# Patient Record
Sex: FEMALE | HISPANIC OR LATINO | URBAN - METROPOLITAN AREA
[De-identification: names, ages, dates, MRNs, and addresses within clinical notes are randomized per-mention and may not be internally consistent; named-entity substitution may affect disease eponyms.]

---

## 2017-06-01 ENCOUNTER — APPOINTMENT (RX ONLY)
Dept: URBAN - METROPOLITAN AREA CLINIC 122 | Facility: CLINIC | Age: 27
Setting detail: DERMATOLOGY
End: 2017-06-01

## 2017-06-01 DIAGNOSIS — Z41.9 ENCOUNTER FOR PROCEDURE FOR PURPOSES OTHER THAN REMEDYING HEALTH STATE, UNSPECIFIED: ICD-10-CM

## 2017-06-01 PROCEDURE — ? LASER TATTOO REMOVAL

## 2017-06-01 ASSESSMENT — LOCATION DETAILED DESCRIPTION DERM: LOCATION DETAILED: RIGHT DISTAL LATERAL CALF

## 2017-06-01 ASSESSMENT — LOCATION SIMPLE DESCRIPTION DERM: LOCATION SIMPLE: RIGHT CALF

## 2017-06-01 ASSESSMENT — LOCATION ZONE DERM: LOCATION ZONE: LEG

## 2017-06-01 NOTE — HPI: COSMETIC (LASER TATTOO REMOVAL)
Have You Had Laser Tattoo Removal Before?: has not had previous treatments
When Outside In The Sun, Do You...: mostly tans, rarely burns

## 2017-06-01 NOTE — PROCEDURE: LASER TATTOO REMOVAL
External Cooling Fan Speed: 0
Consent: Written consent obtained, risks reviewed including but not limited to crusting, scabbing, blistering, scarring, darker or lighter pigmentary change, systemic reactions, ulceration, incidental hair removal, bruising, and/or incomplete removal.
Detail Level: Detailed
Laser Type: Q-switched Alexandrite 755nm
Spot Size In Mm: 2
Tattoo Color Treated: Black
Endpoint Text: Immediate endpoint: skin whitening and pinpoint bleeding.
Anesthesia Volume In Cc: 4
Post-Procedure Text: Polysporin and bandage applied. Post care reviewed with patient.
Spot Size In Mm: 5
Pre-Procedure Text: The treatment areas were cleaned and treated with the laser parameters noted above.
Post-Care Instructions: I reviewed with the patient in detail post-care instructions. Patient should apply vaseline twice daily to tattoo and keep it covered with a non-stick adhesive dressing.
Fluence: 1.7
Anesthesia Type: 1% lidocaine without epinephrine
Area In Cm^2: 7

## 2017-06-14 ENCOUNTER — RX ONLY (OUTPATIENT)
Age: 27
Setting detail: RX ONLY
End: 2017-06-14

## 2017-06-14 RX ORDER — TAZAROTENE 1 MG/G
AEROSOL, FOAM TOPICAL
Qty: 50 | Refills: 3 | Status: ERX | COMMUNITY
Start: 2017-06-14

## 2017-06-21 ENCOUNTER — RX ONLY (OUTPATIENT)
Age: 27
Setting detail: RX ONLY
End: 2017-06-21

## 2017-06-21 RX ORDER — CALCIPOTRIENE 50 UG/G
AEROSOL, FOAM TOPICAL
Qty: 60 | Refills: 0 | Status: ERX | COMMUNITY
Start: 2017-06-21

## 2017-07-06 ENCOUNTER — RX ONLY (OUTPATIENT)
Age: 27
Setting detail: RX ONLY
End: 2017-07-06

## 2017-07-06 RX ORDER — AZITHROMYCIN DIHYDRATE 250 MG/1
TABLET, FILM COATED ORAL
Qty: 1 | Refills: 0 | Status: ERX | COMMUNITY
Start: 2017-07-06

## 2017-07-19 ENCOUNTER — RX ONLY (OUTPATIENT)
Age: 27
Setting detail: RX ONLY
End: 2017-07-19

## 2017-07-19 RX ORDER — CLOBETASOL PROPIONATE 0.5 MG/ML
SOLUTION TOPICAL
Qty: 1 | Refills: 4 | Status: ERX | COMMUNITY
Start: 2017-07-19

## 2018-01-25 ENCOUNTER — RX ONLY (OUTPATIENT)
Age: 28
Setting detail: RX ONLY
End: 2018-01-25

## 2018-01-25 RX ORDER — ERYTHROMYCIN AND BENZOYL PEROXIDE 30; 50 MG/G; MG/G
1 GEL TOPICAL
Qty: 1 | Refills: 5 | Status: ERX | COMMUNITY
Start: 2018-01-25

## 2018-03-22 ENCOUNTER — APPOINTMENT (RX ONLY)
Dept: URBAN - METROPOLITAN AREA CLINIC 122 | Facility: CLINIC | Age: 28
Setting detail: DERMATOLOGY
End: 2018-03-22

## 2018-03-22 DIAGNOSIS — B07.8 OTHER VIRAL WARTS: ICD-10-CM

## 2018-03-22 PROCEDURE — ? BENIGN DESTRUCTION

## 2018-03-22 PROCEDURE — 17110 DESTRUCTION B9 LES UP TO 14: CPT

## 2018-03-22 ASSESSMENT — LOCATION ZONE DERM: LOCATION ZONE: LEG

## 2018-03-22 ASSESSMENT — LOCATION SIMPLE DESCRIPTION DERM: LOCATION SIMPLE: LEFT KNEE

## 2018-03-22 ASSESSMENT — LOCATION DETAILED DESCRIPTION DERM: LOCATION DETAILED: LEFT KNEE

## 2018-03-22 NOTE — PROCEDURE: BENIGN DESTRUCTION
Medical Necessity Clause: This procedure was medically necessary,
Medical Necessity Information: It is in your best interest to select a reason for this procedure from the list below. All of these items fulfill various CMS LCD requirements except the new and changing color options.
Detail Level: Zone
Anesthesia Type: 1% lidocaine without epinephrine and a 1:10 solution of 8.4% sodium bicarbonate
Total Number Of Lesions Treated: 2
Include Z78.9 (Other Specified Conditions Influencing Health Status) As An Associated Diagnosis?: No
Consent: The patient's consent was obtained including but not limited to risks of crusting, scabbing, blistering, scarring, darker or lighter pigmentary change, recurrence, incomplete removal and infection.
Post-Care Instructions: I reviewed with the patient in detail post-care instructions. Patient is to wear sunprotection, and avoid picking at any of the treated lesions. Pt may apply Vaseline to crusted or scabbing areas.wash off in two hours
Anesthesia Volume In Cc: 0.2

## 2018-03-29 ENCOUNTER — RX ONLY (OUTPATIENT)
Age: 28
Setting detail: RX ONLY
End: 2018-03-29

## 2018-03-29 RX ORDER — TAZAROTENE 1 MG/G
1 CREAM TOPICAL AS DIRECTED
Qty: 1 | Refills: 3 | Status: ERX | COMMUNITY
Start: 2018-03-29

## 2018-03-29 RX ORDER — SODIUM SULFACETAMIDE AND SULFUR 80; 40 MG/ML; MG/ML
1 LOTION TOPICAL AS DIRECTED
Qty: 1 | Refills: 3 | Status: ERX | COMMUNITY
Start: 2018-03-29

## 2018-03-29 RX ORDER — SALICYLIC ACID 6 %-6 %
1 KIT TOPICAL AS DIRECTED
Qty: 1 | Refills: 3 | Status: ERX | COMMUNITY
Start: 2018-03-29

## 2018-03-29 RX ORDER — CLINDAMYCIN PHOSPHATE 10 MG/G
1 GEL TOPICAL AS DIRECTED
Qty: 1 | Refills: 3 | Status: ERX | COMMUNITY
Start: 2018-03-29

## 2018-05-03 ENCOUNTER — RX ONLY (OUTPATIENT)
Age: 28
Setting detail: RX ONLY
End: 2018-05-03

## 2018-05-03 RX ORDER — CLOBETASOL PROPIONATE 0.5 MG/G
OINTMENT TOPICAL
Qty: 60 | Refills: 3 | Status: ERX | COMMUNITY
Start: 2018-05-03

## 2018-05-22 ENCOUNTER — APPOINTMENT (RX ONLY)
Dept: URBAN - METROPOLITAN AREA CLINIC 122 | Facility: CLINIC | Age: 28
Setting detail: DERMATOLOGY
End: 2018-05-22

## 2019-02-13 ENCOUNTER — RX ONLY (OUTPATIENT)
Age: 29
Setting detail: RX ONLY
End: 2019-02-13

## 2019-02-13 RX ORDER — HALOBETASOL PROPIONATE 0.5 MG/G
AEROSOL, FOAM TOPICAL
Qty: 1 | Refills: 3 | Status: ERX | COMMUNITY
Start: 2019-02-13

## 2019-02-18 ENCOUNTER — APPOINTMENT (RX ONLY)
Dept: URBAN - METROPOLITAN AREA CLINIC 122 | Facility: CLINIC | Age: 29
Setting detail: DERMATOLOGY
End: 2019-02-18

## 2019-02-18 DIAGNOSIS — B07.8 OTHER VIRAL WARTS: ICD-10-CM

## 2019-02-18 DIAGNOSIS — L84 CORNS AND CALLOSITIES: ICD-10-CM

## 2019-02-18 PROBLEM — E03.9 HYPOTHYROIDISM, UNSPECIFIED: Status: ACTIVE | Noted: 2019-02-18

## 2019-02-18 PROCEDURE — 17110 DESTRUCTION B9 LES UP TO 14: CPT

## 2019-02-18 PROCEDURE — ? TREATMENT REGIMEN

## 2019-02-18 PROCEDURE — ? BENIGN DESTRUCTION

## 2019-02-18 PROCEDURE — 99212 OFFICE O/P EST SF 10 MIN: CPT | Mod: 25

## 2019-02-18 ASSESSMENT — LOCATION DETAILED DESCRIPTION DERM: LOCATION DETAILED: LEFT LATERAL PLANTAR 4TH TOE

## 2019-02-18 ASSESSMENT — LOCATION SIMPLE DESCRIPTION DERM: LOCATION SIMPLE: PLANTAR SURFACE OF LEFT 4TH TOE

## 2019-02-18 ASSESSMENT — LOCATION ZONE DERM: LOCATION ZONE: TOE

## 2019-02-18 NOTE — PROCEDURE: BENIGN DESTRUCTION
Add 52 Modifier (Optional): no
Total Number Of Lesions Treated: 1
Detail Level: Simple
Consent: The patient's consent was obtained including but not limited to risks of crusting, scabbing, blistering, scarring, darker or lighter pigmentary change, recurrence, incomplete removal and infection.
Medical Necessity Information: It is in your best interest to select a reason for this procedure from the list below. All of these items fulfill various CMS LCD requirements except the new and changing color options.
Post-Care Instructions: I reviewed with the patient in detail post-care instructions. Patient is to wear sunprotection, and avoid picking at any of the treated lesions. Pt may apply Vaseline to crusted or scabbing areas.wash off in two hours
Medical Necessity Clause: This procedure was medically necessary,

## 2019-06-03 ENCOUNTER — RX ONLY (OUTPATIENT)
Age: 29
Setting detail: RX ONLY
End: 2019-06-03

## 2019-06-03 RX ORDER — KETOCONAZOLE 20.5 MG/ML
SMALL SHAMPOO, SUSPENSION TOPICAL DAILY
Qty: 120 | Refills: 10 | Status: ERX | COMMUNITY
Start: 2019-06-03

## 2019-08-22 ENCOUNTER — RX ONLY (OUTPATIENT)
Age: 29
Setting detail: RX ONLY
End: 2019-08-22

## 2019-08-22 RX ORDER — SILVER SULFADIAZINE 10 MG/G
1 CREAM TOPICAL
Qty: 1 | Refills: 1 | Status: ERX | COMMUNITY
Start: 2019-08-22

## 2019-10-31 ENCOUNTER — RX ONLY (OUTPATIENT)
Age: 29
Setting detail: RX ONLY
End: 2019-10-31

## 2019-10-31 RX ORDER — AMMONIUM LACTATE 120 MG/G
CREAM TOPICAL
Qty: 1 | Refills: 5 | Status: ERX | COMMUNITY
Start: 2019-10-31

## 2019-11-12 ENCOUNTER — APPOINTMENT (RX ONLY)
Dept: URBAN - METROPOLITAN AREA CLINIC 83 | Facility: CLINIC | Age: 29
Setting detail: DERMATOLOGY
End: 2019-11-12

## 2019-11-12 DIAGNOSIS — L259 CONTACT DERMATITIS AND OTHER ECZEMA, UNSPECIFIED CAUSE: ICD-10-CM

## 2019-11-12 PROCEDURE — ? EXTENDED SERIES READING

## 2019-11-12 NOTE — PROCEDURE: EXTENDED SERIES READING
Name Of Allergen 60: TRICLOSAN
Allergen 106 Reaction: no reaction
Name Of Allergen 32: THIMEROSAL
Name Of Allergen 55: 2-Hydroxyethyl methacrylate
Name Of Allergen 5: IMIDAZOLIDINYL UREA
Name Of Allergen 64: 2-n-Octyl-4-isothiazolin-3-one
Name Of Allergen 19: MYROXYLON PEREIRAE RESIN
Name Of Allergen 82: *METAL* Vanadium (III) chloride
Name Of Allergen 42: Methyl methacrylate
Detail Level: Zone
Name Of Allergen 29: GLUTARAL
Name Of Allergen 52: BENZYL SALICYLATE
Name Of Allergen 2: 2-MERCAPTOBENZOTHIAZOLE(MBT)
Name Of Allergen 11: Clobetsol-17-propionate
Name Of Allergen 25: DISPERSE ORANGE 3
Name Of Allergen 48: Hydrocortisone-17-butyrate
Name Of Allergen 75: Amidoamine
Name Of Allergen 35: CHLOROXYLENOL (PCMX)
Name Of Allergen 67: Lidocaine
Number Of Patches Read: 1
Name Of Allergen 8: CARBA MIX
Name Of Allergen 58: BENZYL ALCOHOL
Name Of Allergen 72: HYDROXYISOHEXYL 3- CYCLOHEXENE CARBOXALDEHYDE
Name Of Allergen 22: TOCOPHEROL
Name Of Allergen 85: *METAL* Titanium (III) nitride
Name Of Allergen 45: Budesonide
Name Of Allergen 81: *METAL* Molybdenum
Name Of Allergen 28: Fragrance mix I
Name Of Allergen 1: BENZOCAINE
Name Of Allergen 51: Disperse Yellow 3
Name Of Allergen 78: METHYLISOTHIAZOLINONE + METHYLCHLOROISOTHIAZOLINONE
Name Of Allergen 38: Gold(I)sodium thiosulfate dihydrate
Name Of Allergen 14: HERBERT
Name Of Allergen 70: Benzoylperoxide
Name Of Allergen 7: AMERCHOL L-101
Name Of Allergen 57: CANANGA ODORATA OIL
Name Of Allergen 66: Compositae mix II
Name Of Allergen 21: DIAZOLIDINYL UREA
Name Of Allergen 84: *METAL* Titanium
Name Of Allergen 44: Tixocortol-21-pivalate
Name Of Allergen 31: Sesquiterpene lactone mix
Name Of Allergen 4: P-PHENYLENEDIAMINE(PPD)
Name Of Allergen 54: METHYLISOTHIAZOLINONE
Name Of Allergen 63: IODOPROPYNYL BUTYLCARBAMATE
Name Of Allergen 18: Potassium dichromate
Show Negative Results In The Note?: Yes
Name Of Allergen 17: N-Isopropyl-N-phenyl-4-phenykenediamine (IPPD)
Name Of Allergen 41: Toluenesulfonamide formalyde resin
Name Of Allergen 13: Epoxy resin, Bisphenol A
Name Of Allergen 37: 2-tert-Butyl-4-methoxyphenol
Name Of Allergen 69: Dibucaine hydrochloride
Name Of Allergen 10: THIURAM MIX
Name Of Allergen 74: BENZALKONIUM CHLORIDE
Name Of Allergen 24: Mixed dialkyl thiourea
Name Of Allergen 47: TRIETHANOLAMINE
What Reading Time Point?: 24 hour
Name Of Allergen 34: BENZOPHENONE-3
Name Of Allergen 53: DECYL GLUCOSIDE
Name Of Allergen 30: 2-BROMO-2-NITROPROPANE-1,3-DIOL
Name Of Allergen 3: COLOPHONIUM
Name Of Allergen 62: POLYSORBATE 80
Name Of Allergen 80: Dimethylol dihydroxy ethylene urea
Name Of Allergen 40: GLYCERYL THIOGLYCOLATE
Name Of Allergen 16: Mercapto mix
Name Of Allergen 27: METHYLDIBROMO GLUTARONITRILE
Name Of Allergen 77: FORMALDEHYDE
Name Of Allergen 50: Fragrance mix II
Name Of Allergen 9: NEOMYCIN SULFATE
Name Of Allergen 59: ISOPROPYL MYRISTATE
Name Of Allergen 23: Bacitracin
Name Of Allergen 73: ETHYHEXYL SALICYLATE
Show Allergen Counseling In The Note?: No
Name Of Allergen 46: COCAMIDE
Name Of Allergen 33: PROPOLIS
Name Of Allergen 65: Disperse Blue mix 106 / 124
Name Of Allergen 6: CINNAMAL
Name Of Allergen 20: Nickel(II)sulfate hexahydrate
Name Of Allergen 56: DMDM HYDANTOIN
Name Of Allergen 43: Colbalt(II)chloride hexahydrate
Name Of Allergen 83: *METAL* Vanadium
Name Of Allergen 15: 5-wpkz-Ykxboktndjffyewuwzxailn resin (PTBP)
Name Of Allergen 61: DESOXIMETASONE
Name Of Allergen 39: Ethyl acrylate
Name Of Allergen 79: PROPYLENE GLYCOL
Name Of Allergen 71: ISOAMYL p-METHOXYCINNAMATE
Name Of Allergen 49: Tea Tree Oil oxidized
Name Of Allergen 26: Paraben mix
Name Of Allergen 12: Ethylendiamine dihydrochloride
Name Of Allergen 76: COAMIDOPROPYL BETAINE
Name Of Allergen 36: Ethyleneurea, melamie formaldehyde mix
Name Of Allergen 68: Fusidic acid sodium salt

## 2019-11-12 NOTE — PROCEDURE: EXTENDED SERIES READING
Allergen 122 Reaction: no reaction
Name Of Allergen 81: *METAL* Molybdenum
Name Of Allergen 4: P-PHENYLENEDIAMINE(PPD)
Name Of Allergen 40: GLYCERYL THIOGLYCOLATE
Name Of Allergen 23: Bacitracin
Name Of Allergen 19: MYROXYLON PEREIRAE RESIN
Name Of Allergen 56: DMDM HYDANTOIN
Name Of Allergen 47: TRIETHANOLAMINE
Name Of Allergen 46: COCAMIDE
Name Of Allergen 74: BENZALKONIUM CHLORIDE
Name Of Allergen 51: Disperse Yellow 3
Name Of Allergen 66: Compositae mix II
Name Of Allergen 64: 2-n-Octyl-4-isothiazolin-3-one
Name Of Allergen 49: Tea Tree Oil oxidized
Name Of Allergen 16: Mercapto mix
Name Of Allergen 76: COAMIDOPROPYL BETAINE
Number Of Patches Read: 1
Name Of Allergen 26: Paraben mix
Name Of Allergen 37: 2-tert-Butyl-4-methoxyphenol
Name Of Allergen 79: PROPYLENE GLYCOL
Name Of Allergen 67: Lidocaine
Name Of Allergen 25: DISPERSE ORANGE 3
Name Of Allergen 72: HYDROXYISOHEXYL 3- CYCLOHEXENE CARBOXALDEHYDE
Name Of Allergen 75: Amidoamine
Name Of Allergen 69: Dibucaine hydrochloride
Name Of Allergen 18: Potassium dichromate
Name Of Allergen 21: DIAZOLIDINYL UREA
Name Of Allergen 13: Epoxy resin, Bisphenol A
Name Of Allergen 30: 2-BROMO-2-NITROPROPANE-1,3-DIOL
Name Of Allergen 39: Ethyl acrylate
Name Of Allergen 71: ISOAMYL p-METHOXYCINNAMATE
Name Of Allergen 54: METHYLISOTHIAZOLINONE
Name Of Allergen 11: Clobetsol-17-propionate
Name Of Allergen 63: IODOPROPYNYL BUTYLCARBAMATE
Name Of Allergen 14: HERBERT
Name Of Allergen 80: Dimethylol dihydroxy ethylene urea
Name Of Allergen 73: ETHYHEXYL SALICYLATE
Name Of Allergen 17: N-Isopropyl-N-phenyl-4-phenykenediamine (IPPD)
Name Of Allergen 50: Fragrance mix II
Name Of Allergen 35: CHLOROXYLENOL (PCMX)
Name Of Allergen 27: METHYLDIBROMO GLUTARONITRILE
Name Of Allergen 78: METHYLISOTHIAZOLINONE + METHYLCHLOROISOTHIAZOLINONE
Name Of Allergen 60: TRICLOSAN
Name Of Allergen 48: Hydrocortisone-17-butyrate
Name Of Allergen 34: BENZOPHENONE-3
Name Of Allergen 59: ISOPROPYL MYRISTATE
Name Of Allergen 45: Budesonide
Name Of Allergen 1: BENZOCAINE
Name Of Allergen 70: Benzoylperoxide
What Reading Time Point?: 24 hour
Name Of Allergen 5: IMIDAZOLIDINYL UREA
Name Of Allergen 42: Methyl methacrylate
Name Of Allergen 28: Fragrance mix I
Name Of Allergen 6: CINNAMAL
Name Of Allergen 85: *METAL* Titanium (III) nitride
Name Of Allergen 29: GLUTARAL
Name Of Allergen 31: Sesquiterpene lactone mix
Name Of Allergen 8: CARBA MIX
Name Of Allergen 53: DECYL GLUCOSIDE
Name Of Allergen 68: Fusidic acid sodium salt
Name Of Allergen 61: DESOXIMETASONE
Detail Level: Zone
Name Of Allergen 52: BENZYL SALICYLATE
Name Of Allergen 32: THIMEROSAL
Name Of Allergen 44: Tixocortol-21-pivalate
Name Of Allergen 2: 2-MERCAPTOBENZOTHIAZOLE(MBT)
Name Of Allergen 84: *METAL* Titanium
Name Of Allergen 77: FORMALDEHYDE
Name Of Allergen 43: Colbalt(II)chloride hexahydrate
Name Of Allergen 24: Mixed dialkyl thiourea
Name Of Allergen 62: POLYSORBATE 80
Name Of Allergen 12: Ethylenediamine dihydrochloride
Name Of Allergen 82: *METAL* Vanadium (III) chloride
Name Of Allergen 22: TOCOPHEROL
Name Of Allergen 3: COLOPHONIUM
Name Of Allergen 9: NEOMYCIN SULFATE
Name Of Allergen 65: Disperse Blue mix 106 / 124
Show Allergen Counseling In The Note?: No
Name Of Allergen 38: Gold(I)sodium thiosulfate dihydrate
Show Negative Results In The Note?: Yes
Name Of Allergen 7: AMERCHOL L-101
Name Of Allergen 57: CANANGA ODORATA OIL
Name Of Allergen 41: Toluenesulfonamide formalyde resin
Name Of Allergen 15: 9-fpso-Qdpowpeosqgagqhnlbqdjib resin (PTBP)
Name Of Allergen 10: THIURAM MIX
Name Of Allergen 20: Nickel(II)sulfate hexahydrate
Name Of Allergen 58: BENZYL ALCOHOL
Name Of Allergen 55: 2-Hydroxyethyl methacrylate
Name Of Allergen 36: Ethyleneurea, melamie formaldehyde mix
Name Of Allergen 33: PROPOLIS
Name Of Allergen 83: *METAL* Vanadium
Name Of Allergen 12: Ethylendiamine dihydrochloride

## 2019-11-21 ENCOUNTER — APPOINTMENT (RX ONLY)
Dept: URBAN - METROPOLITAN AREA CLINIC 83 | Facility: CLINIC | Age: 29
Setting detail: DERMATOLOGY
End: 2019-11-21

## 2019-11-21 DIAGNOSIS — L259 CONTACT DERMATITIS AND OTHER ECZEMA, UNSPECIFIED CAUSE: ICD-10-CM

## 2019-11-21 PROCEDURE — ? EXTENDED SERIES READING

## 2019-11-21 NOTE — PROCEDURE: EXTENDED SERIES READING
Allergen 159 Reaction: no reaction
Name Of Allergen 1: BENZOCAINE
Name Of Allergen 8: CARBA MIX
Name Of Allergen 4: P-PHENYLENEDIAMINE(PPD)
What Reading Time Point?: 24 hour
Name Of Allergen 11: Clobetsol-17-propionate
Show Negative Results In The Note?: Yes
Name Of Allergen 7: AMERCHOL L-101
Name Of Allergen 10: THIURAM MIX
Name Of Allergen 3: COLOPHONIUM
Detail Level: Zone
Name Of Allergen 6: CINNAMAL
Show Allergen Counseling In The Note?: No
Name Of Allergen 2: 2-MERCAPTOBENZOTHIAZOLE(MBT)
Name Of Allergen 9: NEOMYCIN SULFATE
Name Of Allergen 5: IMIDAZOLIDINYL UREA
Number Of Patches Read: 1
Name Of Allergen 12: Ethylendiamine dihydrochloride

## 2020-01-31 ENCOUNTER — RX ONLY (OUTPATIENT)
Age: 30
Setting detail: RX ONLY
End: 2020-01-31

## 2020-01-31 RX ORDER — ADAPALENE AND BENZOYL PEROXIDE 3; 25 MG/G; MG/G
GEL TOPICAL
Qty: 1 | Refills: 7 | Status: ERX | COMMUNITY
Start: 2020-01-31

## 2020-02-03 ENCOUNTER — APPOINTMENT (RX ONLY)
Dept: URBAN - METROPOLITAN AREA CLINIC 83 | Facility: CLINIC | Age: 30
Setting detail: DERMATOLOGY
End: 2020-02-03

## 2020-02-03 DIAGNOSIS — L70.0 ACNE VULGARIS: ICD-10-CM

## 2020-02-03 PROCEDURE — ? COUNSELING

## 2020-02-03 PROCEDURE — 99213 OFFICE O/P EST LOW 20 MIN: CPT

## 2020-02-03 PROCEDURE — ? TREATMENT REGIMEN

## 2020-02-03 ASSESSMENT — LOCATION DETAILED DESCRIPTION DERM
LOCATION DETAILED: LEFT INFERIOR MEDIAL MALAR CHEEK
LOCATION DETAILED: RIGHT INFERIOR CENTRAL MALAR CHEEK
LOCATION DETAILED: LEFT CENTRAL MALAR CHEEK

## 2020-02-03 ASSESSMENT — LOCATION SIMPLE DESCRIPTION DERM
LOCATION SIMPLE: LEFT CHEEK
LOCATION SIMPLE: RIGHT CHEEK

## 2020-02-03 ASSESSMENT — LOCATION ZONE DERM: LOCATION ZONE: FACE

## 2020-02-03 NOTE — PROCEDURE: COUNSELING
Erythromycin Pregnancy And Lactation Text: This medication is Pregnancy Category B and is considered safe during pregnancy. It is also excreted in breast milk.
Topical Retinoid Pregnancy And Lactation Text: This medication is Pregnancy Category C. It is unknown if this medication is excreted in breast milk.
Bactrim Counseling:  I discussed with the patient the risks of sulfa antibiotics including but not limited to GI upset, allergic reaction, drug rash, diarrhea, dizziness, photosensitivity, and yeast infections.  Rarely, more serious reactions can occur including but not limited to aplastic anemia, agranulocytosis, methemoglobinemia, blood dyscrasias, liver or kidney failure, lung infiltrates or desquamative/blistering drug rashes.
Dapsone Pregnancy And Lactation Text: This medication is Pregnancy Category C and is not considered safe during pregnancy or breast feeding.
Isotretinoin Counseling: Patient should get monthly blood tests, not donate blood, not drive at night if vision affected, not share medication, and not undergo elective surgery for 6 months after tx completed. Side effects reviewed, pt to contact office should one occur.
Topical Sulfur Applications Counseling: Topical Sulfur Counseling: Patient counseled that this medication may cause skin irritation or allergic reactions.  In the event of skin irritation, the patient was advised to reduce the amount of the drug applied or use it less frequently.   The patient verbalized understanding of the proper use and possible adverse effects of topical sulfur application.  All of the patient's questions and concerns were addressed.
Detail Level: Detailed
Minocycline Counseling: Patient advised regarding possible photosensitivity and discoloration of the teeth, skin, lips, tongue and gums.  Patient instructed to avoid sunlight, if possible.  When exposed to sunlight, patients should wear protective clothing, sunglasses, and sunscreen.  The patient was instructed to call the office immediately if the following severe adverse effects occur:  hearing changes, easy bruising/bleeding, severe headache, or vision changes.  The patient verbalized understanding of the proper use and possible adverse effects of minocycline.  All of the patient's questions and concerns were addressed.
Tetracycline Pregnancy And Lactation Text: This medication is Pregnancy Category D and not consider safe during pregnancy. It is also excreted in breast milk.
Bactrim Pregnancy And Lactation Text: This medication is Pregnancy Category D and is known to cause fetal risk.  It is also excreted in breast milk.
Topical Sulfur Applications Pregnancy And Lactation Text: This medication is Pregnancy Category C and has an unknown safety profile during pregnancy. It is unknown if this topical medication is excreted in breast milk.
Tazorac Counseling:  Patient advised that medication is irritating and drying.  Patient may need to apply sparingly and wash off after an hour before eventually leaving it on overnight.  The patient verbalized understanding of the proper use and possible adverse effects of tazorac.  All of the patient's questions and concerns were addressed.
Birth Control Pills Counseling: Birth Control Pill Counseling: I discussed with the patient the potential side effects of OCPs including but not limited to increased risk of stroke, heart attack, thrombophlebitis, deep venous thrombosis, hepatic adenomas, breast changes, GI upset, headaches, and depression.  The patient verbalized understanding of the proper use and possible adverse effects of OCPs. All of the patient's questions and concerns were addressed.
Benzoyl Peroxide Counseling: Patient counseled that medicine may cause skin irritation and bleach clothing.  In the event of skin irritation, the patient was advised to reduce the amount of the drug applied or use it less frequently.   The patient verbalized understanding of the proper use and possible adverse effects of benzoyl peroxide.  All of the patient's questions and concerns were addressed.
Doxycycline Counseling:  Patient counseled regarding possible photosensitivity and increased risk for sunburn.  Patient instructed to avoid sunlight, if possible.  When exposed to sunlight, patients should wear protective clothing, sunglasses, and sunscreen.  The patient was instructed to call the office immediately if the following severe adverse effects occur:  hearing changes, easy bruising/bleeding, severe headache, or vision changes.  The patient verbalized understanding of the proper use and possible adverse effects of doxycycline.  All of the patient's questions and concerns were addressed.
Isotretinoin Pregnancy And Lactation Text: This medication is Pregnancy Category X and is considered extremely dangerous during pregnancy. It is unknown if it is excreted in breast milk.
Spironolactone Counseling: Patient advised regarding risks of diarrhea, abdominal pain, hyperkalemia, birth defects (for female patients), liver toxicity and renal toxicity. The patient may need blood work to monitor liver and kidney function and potassium levels while on therapy. The patient verbalized understanding of the proper use and possible adverse effects of spironolactone.  All of the patient's questions and concerns were addressed.
Tazorac Pregnancy And Lactation Text: This medication is not safe during pregnancy. It is unknown if this medication is excreted in breast milk.
Doxycycline Pregnancy And Lactation Text: This medication is Pregnancy Category D and not consider safe during pregnancy. It is also excreted in breast milk but is considered safe for shorter treatment courses.
Benzoyl Peroxide Pregnancy And Lactation Text: This medication is Pregnancy Category C. It is unknown if benzoyl peroxide is excreted in breast milk.
High Dose Vitamin A Counseling: Side effects reviewed, pt to contact office should one occur.
Topical Clindamycin Counseling: Patient counseled that this medication may cause skin irritation or allergic reactions.  In the event of skin irritation, the patient was advised to reduce the amount of the drug applied or use it less frequently.   The patient verbalized understanding of the proper use and possible adverse effects of clindamycin.  All of the patient's questions and concerns were addressed.
Azithromycin Counseling:  I discussed with the patient the risks of azithromycin including but not limited to GI upset, allergic reaction, drug rash, diarrhea, and yeast infections.
Birth Control Pills Pregnancy And Lactation Text: This medication should be avoided if pregnant and for the first 30 days post-partum.
Erythromycin Counseling:  I discussed with the patient the risks of erythromycin including but not limited to GI upset, allergic reaction, drug rash, diarrhea, increase in liver enzymes, and yeast infections.
Topical Retinoid counseling:  Patient advised to apply a pea-sized amount only at bedtime and wait 30 minutes after washing their face before applying.  If too drying, patient may add a non-comedogenic moisturizer. The patient verbalized understanding of the proper use and possible adverse effects of retinoids.  All of the patient's questions and concerns were addressed.
Include Pregnancy/Lactation Warning?: No
Azithromycin Pregnancy And Lactation Text: This medication is considered safe during pregnancy and is also secreted in breast milk.
High Dose Vitamin A Pregnancy And Lactation Text: High dose vitamin A therapy is contraindicated during pregnancy and breast feeding.
Spironolactone Pregnancy And Lactation Text: This medication can cause feminization of the male fetus and should be avoided during pregnancy. The active metabolite is also found in breast milk.
Dapsone Counseling: I discussed with the patient the risks of dapsone including but not limited to hemolytic anemia, agranulocytosis, rashes, methemoglobinemia, kidney failure, peripheral neuropathy, headaches, GI upset, and liver toxicity.  Patients who start dapsone require monitoring including baseline LFTs and weekly CBCs for the first month, then every month thereafter.  The patient verbalized understanding of the proper use and possible adverse effects of dapsone.  All of the patient's questions and concerns were addressed.
Tetracycline Counseling: Patient counseled regarding possible photosensitivity and increased risk for sunburn.  Patient instructed to avoid sunlight, if possible.  When exposed to sunlight, patients should wear protective clothing, sunglasses, and sunscreen.  The patient was instructed to call the office immediately if the following severe adverse effects occur:  hearing changes, easy bruising/bleeding, severe headache, or vision changes.  The patient verbalized understanding of the proper use and possible adverse effects of tetracycline.  All of the patient's questions and concerns were addressed. Patient understands to avoid pregnancy while on therapy due to potential birth defects.
Topical Clindamycin Pregnancy And Lactation Text: This medication is Pregnancy Category B and is considered safe during pregnancy. It is unknown if it is excreted in breast milk.

## 2020-02-03 NOTE — PROCEDURE: TREATMENT REGIMEN
Detail Level: Simple
Plan: Start Epiduo Forte, pt failed Fabior, adapalene, clindamycin, tretinoin, benzoyl peroxide
Initiate Treatment: Epiduo Forte\\nXimino

## 2020-04-23 ENCOUNTER — RX ONLY (OUTPATIENT)
Age: 30
Setting detail: RX ONLY
End: 2020-04-23

## 2020-07-21 ENCOUNTER — RX ONLY (OUTPATIENT)
Age: 30
Setting detail: RX ONLY
End: 2020-07-21

## 2020-07-21 RX ORDER — MINOCYCLINE 40 MG/G
ONE AEROSOL, FOAM TOPICAL DAILY
Qty: 1 | Refills: 5 | Status: ERX | COMMUNITY
Start: 2020-07-21

## 2021-04-14 ENCOUNTER — APPOINTMENT (RX ONLY)
Dept: URBAN - METROPOLITAN AREA CLINIC 83 | Facility: CLINIC | Age: 31
Setting detail: DERMATOLOGY
End: 2021-04-14

## 2021-04-14 DIAGNOSIS — D485 NEOPLASM OF UNCERTAIN BEHAVIOR OF SKIN: ICD-10-CM

## 2021-04-14 PROBLEM — D48.5 NEOPLASM OF UNCERTAIN BEHAVIOR OF SKIN: Status: ACTIVE | Noted: 2021-04-14

## 2021-04-14 PROCEDURE — ? SHAVE REMOVAL

## 2021-04-14 ASSESSMENT — LOCATION DETAILED DESCRIPTION DERM: LOCATION DETAILED: PERIUMBILICAL SKIN

## 2021-04-14 ASSESSMENT — LOCATION SIMPLE DESCRIPTION DERM: LOCATION SIMPLE: ABDOMEN

## 2021-04-14 ASSESSMENT — LOCATION ZONE DERM: LOCATION ZONE: TRUNK

## 2021-04-14 NOTE — HPI: EVALUATION OF SKIN LESION(S)
What Type Of Note Output Would You Prefer (Optional)?: Standard Output
How Severe Are Your Spot(S)?: mild
Have Your Spot(S) Been Treated In The Past?: has not been treated
Hpi Title: Evaluation of a Skin Lesion
Additional History: Mole on stomach.

## 2021-04-14 NOTE — PROCEDURE: SHAVE REMOVAL
Medical Necessity Information: It is in your best interest to select a reason for this procedure from the list below. All of these items fulfill various CMS LCD requirements except the new and changing color options.
Medical Necessity Clause: This procedure was medically necessary because the lesion that was treated was:
Lab: [id number]
Lab Facility: [id number]
Detail Level: Detailed
Was A Bandage Applied: Yes
Size Of Lesion In Cm (Required): 0.2
X Size Of Lesion In Cm (Optional): 0
Biopsy Method: scissors
Anesthesia Type: 1% lidocaine with epinephrine
Hemostasis: Drysol
Wound Care: Petrolatum
Path Notes (To The Dermatopathologist): Nevus
Render Path Notes In Note?: No
Consent was obtained from the patient. The risks and benefits to therapy were discussed in detail. Specifically, the risks of infection, scarring, bleeding, prolonged wound healing, incomplete removal, allergy to anesthesia, nerve injury and recurrence were addressed. Prior to the procedure, the treatment site was clearly identified and confirmed by the patient. All components of Universal Protocol/PAUSE Rule completed.
Post-Care Instructions: I reviewed with the patient in detail post-care instructions. Patient is to keep the biopsy site dry overnight, and then apply bacitracin twice daily until healed. Patient may apply hydrogen peroxide soaks to remove any crusting.
Notification Instructions: Patient will be notified of biopsy results. However, patient instructed to call the office if not contacted within 2 weeks.
Billing Type: Third-Party Bill

## 2021-08-16 ENCOUNTER — RX ONLY (OUTPATIENT)
Age: 31
Setting detail: RX ONLY
End: 2021-08-16

## 2021-08-16 RX ORDER — AZITHROMYCIN DIHYDRATE 250 MG/1
TABLET, FILM COATED ORAL
Qty: 1 | Refills: 2 | Status: ERX | COMMUNITY
Start: 2021-08-16

## 2021-10-13 ENCOUNTER — APPOINTMENT (RX ONLY)
Dept: URBAN - METROPOLITAN AREA CLINIC 83 | Facility: CLINIC | Age: 31
Setting detail: DERMATOLOGY
End: 2021-10-13

## 2021-10-13 DIAGNOSIS — Z41.9 ENCOUNTER FOR PROCEDURE FOR PURPOSES OTHER THAN REMEDYING HEALTH STATE, UNSPECIFIED: ICD-10-CM

## 2021-10-13 PROCEDURE — ? COSMETIC CONSULTATION: COOLSCULPTING

## 2021-10-14 ENCOUNTER — APPOINTMENT (RX ONLY)
Dept: URBAN - METROPOLITAN AREA CLINIC 83 | Facility: CLINIC | Age: 31
Setting detail: DERMATOLOGY
End: 2021-10-14

## 2021-12-08 ENCOUNTER — APPOINTMENT (RX ONLY)
Dept: URBAN - METROPOLITAN AREA CLINIC 83 | Facility: CLINIC | Age: 31
Setting detail: DERMATOLOGY
End: 2021-12-08

## 2021-12-08 DIAGNOSIS — Z41.9 ENCOUNTER FOR PROCEDURE FOR PURPOSES OTHER THAN REMEDYING HEALTH STATE, UNSPECIFIED: ICD-10-CM

## 2021-12-08 PROCEDURE — ? COOLSCULPTING

## 2021-12-08 ASSESSMENT — LOCATION ZONE DERM: LOCATION ZONE: FACE

## 2021-12-08 ASSESSMENT — LOCATION SIMPLE DESCRIPTION DERM: LOCATION SIMPLE: SUBMENTAL CHIN

## 2021-12-08 ASSESSMENT — LOCATION DETAILED DESCRIPTION DERM: LOCATION DETAILED: SUBMENTAL CHIN

## 2021-12-08 NOTE — PROCEDURE: COOLSCULPTING
Intro: Prior to treatment, the area was cleaned with alcohol and marked out with a marking pen. The gel sheet was then applied uniformly. The applicator was applied to the skin with good contact and suction.
Time (Minutes - Will Only Render If Nonzero): 0
Applicator Size: standard applicator
Detail Level: Simple
Post Treatment: After treatment, the suction was turned off, and the applicator was removed from the skin.
Suction Settings: The suction settings were per protocol.
Consent: Written consent obtained, risks reviewed including, but not limited to, blistering from suction, darker or lighter pigmentary change, bruising, and/or need for multiple treatments.
Location 1: chin
Device: Coolsculpting
Applicator Size: CoolMini applicator

## 2022-03-09 ENCOUNTER — RX ONLY (OUTPATIENT)
Age: 32
Setting detail: RX ONLY
End: 2022-03-09

## 2022-03-09 RX ORDER — EPINEPHRINE 0.3 MG/.3ML
USE INJECTION INTRAMUSCULAR
Qty: 1 | Refills: 0 | COMMUNITY
Start: 2022-03-09

## 2022-03-15 ENCOUNTER — RX ONLY (OUTPATIENT)
Age: 32
Setting detail: RX ONLY
End: 2022-03-15

## 2022-03-15 RX ORDER — AZITHROMYCIN DIHYDRATE 250 MG/1
TABLET, FILM COATED ORAL
Qty: 1 | Refills: 2 | Status: ERX

## 2022-03-28 ENCOUNTER — RX ONLY (OUTPATIENT)
Age: 32
Setting detail: RX ONLY
End: 2022-03-28

## 2022-03-28 RX ORDER — EPINEPHRINE 0.3 MG/.3ML
INJECTION INTRAMUSCULAR
Qty: 1 | Refills: 0 | Status: ERX

## 2022-07-12 ENCOUNTER — RX ONLY (OUTPATIENT)
Age: 32
Setting detail: RX ONLY
End: 2022-07-12

## 2022-07-12 RX ORDER — TRIAMCINOLONE ACETONIDE 1 MG/G
OINTMENT TOPICAL
Qty: 45 | Refills: 4 | Status: ERX | COMMUNITY
Start: 2022-07-12

## 2022-08-15 ENCOUNTER — RX ONLY (OUTPATIENT)
Age: 32
Setting detail: RX ONLY
End: 2022-08-15

## 2022-08-15 RX ORDER — PHARMACY COMPOUNDING ACCESSORY
EACH MISCELLANEOUS
Qty: 7.5 | Refills: 1 | Status: ERX | COMMUNITY
Start: 2022-08-15

## 2022-09-07 ENCOUNTER — RX ONLY (OUTPATIENT)
Age: 32
Setting detail: RX ONLY
End: 2022-09-07

## 2022-09-07 RX ORDER — FLUCONAZOLE 150 MG/1
TABLET ORAL
Qty: 2 | Refills: 2 | Status: ERX | COMMUNITY
Start: 2022-09-07

## 2023-01-10 ENCOUNTER — RX ONLY (OUTPATIENT)
Age: 33
Setting detail: RX ONLY
End: 2023-01-10

## 2023-01-10 RX ORDER — PHARMACY COMPOUNDING ACCESSORY
EACH MISCELLANEOUS
Qty: 7.5 | Refills: 1 | Status: ERX

## 2023-02-10 ENCOUNTER — RX ONLY (OUTPATIENT)
Age: 33
Setting detail: RX ONLY
End: 2023-02-10

## 2023-02-10 RX ORDER — TRIAMCINOLONE ACETONIDE 1 MG/G
OINTMENT TOPICAL
Qty: 45 | Refills: 4 | Status: ERX | COMMUNITY
Start: 2023-02-10

## 2023-03-09 ENCOUNTER — RX ONLY (OUTPATIENT)
Age: 33
Setting detail: RX ONLY
End: 2023-03-09

## 2023-03-09 RX ORDER — EMOLLIENT COMBINATION NO.43
CREAM (GRAM) TOPICAL
Qty: 30 | Refills: 6 | Status: ERX | COMMUNITY
Start: 2023-03-09

## 2023-04-07 ENCOUNTER — RX ONLY (OUTPATIENT)
Age: 33
Setting detail: RX ONLY
End: 2023-04-07

## 2023-04-07 RX ORDER — AMOXICILLIN 500 MG/1
CAPSULE ORAL
Qty: 40 | Refills: 2 | Status: ERX | COMMUNITY
Start: 2023-04-07

## 2023-10-04 ENCOUNTER — RX ONLY (OUTPATIENT)
Age: 33
Setting detail: RX ONLY
End: 2023-10-04

## 2023-10-04 RX ORDER — KETOCONAZOLE 20 MG/ML
SHAMPOO, SUSPENSION TOPICAL
Qty: 360 | Refills: 2 | Status: ERX | COMMUNITY
Start: 2023-10-04

## 2023-10-31 ENCOUNTER — RX ONLY (OUTPATIENT)
Age: 33
Setting detail: RX ONLY
End: 2023-10-31

## 2023-10-31 RX ORDER — PHARMACY COMPOUNDING ACCESSORY
EACH MISCELLANEOUS
Qty: 7.5 | Refills: 1 | Status: ERX | COMMUNITY
Start: 2023-10-31

## 2025-04-23 ENCOUNTER — RX ONLY (RX ONLY)
Age: 35
End: 2025-04-23

## 2025-04-23 RX ORDER — AMOXICILLIN 500 MG/1
CAPSULE ORAL
Qty: 20 | Refills: 2 | Status: ERX | COMMUNITY
Start: 2025-04-23